# Patient Record
Sex: MALE | Race: WHITE | NOT HISPANIC OR LATINO | Employment: OTHER | URBAN - METROPOLITAN AREA
[De-identification: names, ages, dates, MRNs, and addresses within clinical notes are randomized per-mention and may not be internally consistent; named-entity substitution may affect disease eponyms.]

---

## 2018-01-29 DIAGNOSIS — N52.8 OTHER MALE ERECTILE DYSFUNCTION: Primary | ICD-10-CM

## 2018-01-29 RX ORDER — TAMSULOSIN HYDROCHLORIDE 0.4 MG/1
1 CAPSULE ORAL
COMMUNITY
Start: 2017-10-23

## 2018-01-29 RX ORDER — FINASTERIDE 5 MG/1
1 TABLET, FILM COATED ORAL DAILY
COMMUNITY
Start: 2017-10-28

## 2018-01-29 NOTE — TELEPHONE ENCOUNTER
Spoke with patient regarding request for refill of Viagra 50mg  Our records show 100mg as the preferred script  Patient verified that he breaks the 100mg pill in half as he only needs 50mg on demand  Request for same, 30 day supply with NO refills was generated and forwarded to Dr Nicky Espinal for approval   Patient's next appointment scheduled for 3/14/18; additional refills should be generated at that time  Patient verbalized understanding

## 2018-01-30 RX ORDER — SILDENAFIL 100 MG/1
TABLET, FILM COATED ORAL
Qty: 6 TABLET | Refills: 0 | Status: SHIPPED | OUTPATIENT
Start: 2018-01-30

## 2018-02-05 ENCOUNTER — TELEPHONE (OUTPATIENT)
Dept: UROLOGY | Facility: AMBULATORY SURGERY CENTER | Age: 78
End: 2018-02-05

## 2018-02-05 NOTE — TELEPHONE ENCOUNTER
Pt  Calling back re: Viagra prescription  He spoke to [employer] on 1/29  Wanted the 100 mg tabs sent in  Will direct to her to contact pt

## 2018-02-09 NOTE — TELEPHONE ENCOUNTER
Spoke with the patient  He states pharmacy was confused on the correct dosage for the patient (50 mg vs  100mg )  Patient already stated this medication would NOT be covered and was prepared to pay the cash price  I called [facility], spoke with the technician  She states it's a prior auth situation for non-covered drug  Not pursuing auth for non-covered drugs, pt  Aware of same and again prepared to purchase at cash price  Technician did not mention dosage conflict?! Pharmacy to release script to just process and they will process with a discount card  Patient made aware of same

## 2018-04-24 ENCOUNTER — TELEPHONE (OUTPATIENT)
Dept: UROLOGY | Facility: MEDICAL CENTER | Age: 78
End: 2018-04-24

## 2018-04-24 NOTE — TELEPHONE ENCOUNTER
An Auto-fax Refill Request for Tamsulosin was received from [employer] #[id number]  Patient was due March, 2018 for his yearly recall office visit  He did have a visit scheduled but cancelled it  I left a message on his home phone voice mail to reschedule the visit before additional refills would be issued  I await his return phone call

## 2018-11-09 ENCOUNTER — TELEPHONE (OUTPATIENT)
Dept: UROLOGY | Facility: MEDICAL CENTER | Age: 78
End: 2018-11-09

## 2018-11-09 NOTE — TELEPHONE ENCOUNTER
Received [facility]/[employer] year end audit medical records request for 1/1/2017 - present  Records uploaded to their system 11/9/2018